# Patient Record
Sex: MALE | Race: WHITE
[De-identification: names, ages, dates, MRNs, and addresses within clinical notes are randomized per-mention and may not be internally consistent; named-entity substitution may affect disease eponyms.]

---

## 2020-05-23 ENCOUNTER — HOSPITAL ENCOUNTER (EMERGENCY)
Dept: HOSPITAL 26 - MED | Age: 30
Discharge: HOME | End: 2020-05-23
Payer: MEDICAID

## 2020-05-23 VITALS — DIASTOLIC BLOOD PRESSURE: 84 MMHG | SYSTOLIC BLOOD PRESSURE: 139 MMHG

## 2020-05-23 VITALS — SYSTOLIC BLOOD PRESSURE: 126 MMHG | DIASTOLIC BLOOD PRESSURE: 79 MMHG

## 2020-05-23 VITALS — HEIGHT: 68 IN | WEIGHT: 159.5 LBS | BODY MASS INDEX: 24.17 KG/M2

## 2020-05-23 DIAGNOSIS — R30.0: Primary | ICD-10-CM

## 2020-05-23 DIAGNOSIS — R03.0: ICD-10-CM

## 2020-05-23 DIAGNOSIS — Z11.3: ICD-10-CM

## 2020-05-23 PROCEDURE — 36415 COLL VENOUS BLD VENIPUNCTURE: CPT

## 2020-05-23 PROCEDURE — 96372 THER/PROPH/DIAG INJ SC/IM: CPT

## 2020-05-23 PROCEDURE — 99283 EMERGENCY DEPT VISIT LOW MDM: CPT

## 2020-05-23 PROCEDURE — 87086 URINE CULTURE/COLONY COUNT: CPT

## 2020-05-23 PROCEDURE — 81002 URINALYSIS NONAUTO W/O SCOPE: CPT

## 2020-05-23 NOTE — NUR
Patient discharged with v/s stable. Written and verbal after care instructions 
given and explained. 

Patient alert, oriented and verbalized understanding of instructions. 
Ambulatory with steady gait. All questions addressed prior to discharge. ID 
band removed. Patient advised to follow up with PMD. Rx of IBUPROFEN 600MG, 
KEFLEX 500MG, PHENAZOPYRIDINE HYDROCHLORIDE 100MG given. Patient educated on 
indication of medication including possible reaction and side effects. 
Opportunity to ask questions provided and answered.

## 2020-05-23 NOTE — NUR
30 Y/M PRESENTS TO ED FOR DYSURIA X 2 WEEKS. PT REPORTS 2/10 PAIN AND ALSO 
WHITE DISCHARGE. PT DENIES HEMATURIA OR LOW BACK PAIN. PT A &O X 4, RR EVEN AND 
UNLABORED. DENIES HEMATURIA. DENIES N/V/F/D.



NO HX 

ALLERGIES-NKDA

## 2020-12-29 ENCOUNTER — HOSPITAL ENCOUNTER (EMERGENCY)
Dept: HOSPITAL 26 - MED | Age: 30
Discharge: LEFT BEFORE DISCHARGE | End: 2020-12-29
Payer: MEDICAID

## 2020-12-29 VITALS — SYSTOLIC BLOOD PRESSURE: 141 MMHG | DIASTOLIC BLOOD PRESSURE: 64 MMHG

## 2020-12-29 VITALS — HEIGHT: 68 IN | BODY MASS INDEX: 22.73 KG/M2 | WEIGHT: 150 LBS

## 2020-12-29 VITALS — DIASTOLIC BLOOD PRESSURE: 64 MMHG | SYSTOLIC BLOOD PRESSURE: 141 MMHG

## 2020-12-29 DIAGNOSIS — R30.9: Primary | ICD-10-CM

## 2020-12-29 LAB
APPEARANCE UR: CLEAR
BILIRUB UR QL STRIP: NEGATIVE
COLOR UR: YELLOW
GLUCOSE UR STRIP-MCNC: NEGATIVE MG/DL
HGB UR QL STRIP: NEGATIVE
LEUKOCYTE ESTERASE UR QL STRIP: NEGATIVE
NITRITE UR QL STRIP: NEGATIVE
PH UR STRIP: 7 [PH] (ref 5–9)

## 2020-12-29 PROCEDURE — 99281 EMR DPT VST MAYX REQ PHY/QHP: CPT

## 2020-12-29 PROCEDURE — 36415 COLL VENOUS BLD VENIPUNCTURE: CPT

## 2020-12-29 PROCEDURE — 81003 URINALYSIS AUTO W/O SCOPE: CPT

## 2020-12-29 PROCEDURE — 87491 CHLMYD TRACH DNA AMP PROBE: CPT

## 2020-12-29 PROCEDURE — 87086 URINE CULTURE/COLONY COUNT: CPT

## 2020-12-29 NOTE — NUR
PATIENT CALLED TO ADMINISTER MEDICATION, NO RESPONSE

PATIENT ELOPED FROM FACILITY. DISCHARGE INSTRUCTIONS NOT GIVEN TO PATIENT. DR. JONES NOTIFIED.

## 2023-05-30 NOTE — NUR
PT AWAKE AND ALERT SITTING UPRIGHT. DENIES PAIN AT THIS TIME. RR EVEN AND 
UNLABORED. VSS. WILL CONTINUE TO MONITOR English